# Patient Record
Sex: MALE | Race: WHITE | NOT HISPANIC OR LATINO | URBAN - METROPOLITAN AREA
[De-identification: names, ages, dates, MRNs, and addresses within clinical notes are randomized per-mention and may not be internally consistent; named-entity substitution may affect disease eponyms.]

---

## 2023-07-24 ENCOUNTER — TELEPHONE (OUTPATIENT)
Dept: PRIMARY CARE | Facility: CLINIC | Age: 83
End: 2023-07-24
Payer: MEDICARE

## 2023-07-24 DIAGNOSIS — I10 HYPERTENSION, UNSPECIFIED TYPE: ICD-10-CM

## 2023-07-24 DIAGNOSIS — I25.10 CORONARY ARTERY DISEASE, UNSPECIFIED VESSEL OR LESION TYPE, UNSPECIFIED WHETHER ANGINA PRESENT, UNSPECIFIED WHETHER NATIVE OR TRANSPLANTED HEART: ICD-10-CM

## 2023-07-24 PROBLEM — L03.90 CELLULITIS: Status: ACTIVE | Noted: 2023-07-24

## 2023-07-24 PROBLEM — M25.552 LEFT HIP PAIN: Status: ACTIVE | Noted: 2023-07-24

## 2023-07-24 PROBLEM — N48.1 BALANITIS: Status: ACTIVE | Noted: 2023-07-24

## 2023-07-24 PROBLEM — K59.00 CONSTIPATION: Status: ACTIVE | Noted: 2023-07-24

## 2023-07-24 PROBLEM — M25.472 LEFT ANKLE SWELLING: Status: ACTIVE | Noted: 2023-07-24

## 2023-07-24 PROBLEM — I34.0 MITRAL REGURGITATION: Status: ACTIVE | Noted: 2023-07-24

## 2023-07-24 PROBLEM — E78.00 HYPERCHOLESTEREMIA: Status: ACTIVE | Noted: 2023-07-24

## 2023-07-24 PROBLEM — Z86.79 H/O SICK SINUS SYNDROME: Status: ACTIVE | Noted: 2023-07-24

## 2023-07-24 PROBLEM — N40.0 BENIGN NON-NODULAR PROSTATIC HYPERPLASIA: Status: ACTIVE | Noted: 2023-07-24

## 2023-07-24 RX ORDER — ACETAMINOPHEN 500 MG
1 TABLET ORAL DAILY
COMMUNITY
Start: 2018-02-20

## 2023-07-24 RX ORDER — TELMISARTAN 20 MG/1
20 TABLET ORAL DAILY
COMMUNITY

## 2023-07-24 RX ORDER — NITROGLYCERIN 0.4 MG/1
0.4 TABLET SUBLINGUAL EVERY 5 MIN PRN
COMMUNITY
Start: 2022-04-04

## 2023-07-24 RX ORDER — POLYETHYLENE GLYCOL 3350 17 G/17G
POWDER, FOR SOLUTION ORAL
COMMUNITY
Start: 2017-07-12

## 2023-07-24 RX ORDER — CLOPIDOGREL BISULFATE 75 MG/1
75 TABLET ORAL DAILY
Qty: 90 TABLET | Refills: 0 | Status: SHIPPED | OUTPATIENT
Start: 2023-07-24 | End: 2023-10-23 | Stop reason: SDUPTHER

## 2023-07-24 RX ORDER — CLOPIDOGREL BISULFATE 75 MG/1
75 TABLET ORAL DAILY
COMMUNITY
End: 2023-07-24 | Stop reason: SDUPTHER

## 2023-07-24 RX ORDER — BUSPIRONE HYDROCHLORIDE 15 MG/1
15 TABLET ORAL EVERY 12 HOURS
COMMUNITY
Start: 2023-05-12 | End: 2024-01-26

## 2023-07-24 RX ORDER — ASCORBIC ACID 500 MG
1 TABLET ORAL DAILY
COMMUNITY
Start: 2018-02-20

## 2023-07-24 RX ORDER — AMLODIPINE BESYLATE 5 MG/1
5 TABLET ORAL DAILY
Qty: 90 TABLET | Refills: 0 | Status: SHIPPED | OUTPATIENT
Start: 2023-07-24 | End: 2023-10-23 | Stop reason: SDUPTHER

## 2023-07-24 RX ORDER — AMLODIPINE BESYLATE 5 MG/1
5 TABLET ORAL DAILY
COMMUNITY
End: 2023-07-24 | Stop reason: SDUPTHER

## 2023-07-24 NOTE — TELEPHONE ENCOUNTER
Pt needs a refill on amlodipine besylate 5 MG   X 1 tablet daily     Clopidogrel 75 MG x 1 tablet daily     [facility]  [phone]    Pt is scheduled for appt 8/1/23

## 2023-08-01 ENCOUNTER — OFFICE VISIT (OUTPATIENT)
Dept: PRIMARY CARE | Facility: CLINIC | Age: 83
End: 2023-08-01
Payer: MEDICARE

## 2023-08-01 VITALS
OXYGEN SATURATION: 98 % | HEART RATE: 65 BPM | BODY MASS INDEX: 22.96 KG/M2 | SYSTOLIC BLOOD PRESSURE: 142 MMHG | WEIGHT: 155 LBS | DIASTOLIC BLOOD PRESSURE: 82 MMHG | HEIGHT: 69 IN

## 2023-08-01 DIAGNOSIS — I34.0 MITRAL VALVE INSUFFICIENCY, UNSPECIFIED ETIOLOGY: ICD-10-CM

## 2023-08-01 DIAGNOSIS — I25.10 CORONARY ARTERY DISEASE INVOLVING NATIVE CORONARY ARTERY OF NATIVE HEART WITHOUT ANGINA PECTORIS: Primary | ICD-10-CM

## 2023-08-01 DIAGNOSIS — I10 PRIMARY HYPERTENSION: ICD-10-CM

## 2023-08-01 DIAGNOSIS — I49.5 SSS (SICK SINUS SYNDROME) (MULTI): ICD-10-CM

## 2023-08-01 DIAGNOSIS — J01.01 ACUTE RECURRENT MAXILLARY SINUSITIS: ICD-10-CM

## 2023-08-01 DIAGNOSIS — E78.00 HYPERCHOLESTEREMIA: ICD-10-CM

## 2023-08-01 DIAGNOSIS — Z86.79 H/O SICK SINUS SYNDROME: ICD-10-CM

## 2023-08-01 DIAGNOSIS — N40.0 BENIGN NON-NODULAR PROSTATIC HYPERPLASIA: ICD-10-CM

## 2023-08-01 DIAGNOSIS — R42 VERTIGO: ICD-10-CM

## 2023-08-01 DIAGNOSIS — Z12.5 SCREENING PSA (PROSTATE SPECIFIC ANTIGEN): ICD-10-CM

## 2023-08-01 PROCEDURE — 3077F SYST BP >= 140 MM HG: CPT | Performed by: FAMILY MEDICINE

## 2023-08-01 PROCEDURE — 1159F MED LIST DOCD IN RCRD: CPT | Performed by: FAMILY MEDICINE

## 2023-08-01 PROCEDURE — 1036F TOBACCO NON-USER: CPT | Performed by: FAMILY MEDICINE

## 2023-08-01 PROCEDURE — 3079F DIAST BP 80-89 MM HG: CPT | Performed by: FAMILY MEDICINE

## 2023-08-01 PROCEDURE — 99214 OFFICE O/P EST MOD 30 MIN: CPT | Performed by: FAMILY MEDICINE

## 2023-08-01 PROCEDURE — 1160F RVW MEDS BY RX/DR IN RCRD: CPT | Performed by: FAMILY MEDICINE

## 2023-08-01 RX ORDER — AMOXICILLIN 875 MG/1
875 TABLET, FILM COATED ORAL 2 TIMES DAILY
Qty: 20 TABLET | Refills: 0 | Status: SHIPPED | OUTPATIENT
Start: 2023-08-01 | End: 2023-08-11

## 2023-08-01 ASSESSMENT — ENCOUNTER SYMPTOMS
CARDIOVASCULAR NEGATIVE: 1
JOINT SWELLING: 0
NUMBNESS: 0
RESPIRATORY NEGATIVE: 1
DYSURIA: 0
GASTROINTESTINAL NEGATIVE: 1
HEADACHES: 0
DIZZINESS: 1
FREQUENCY: 0
CONSTITUTIONAL NEGATIVE: 1
BACK PAIN: 0
SINUS PRESSURE: 1

## 2023-08-01 ASSESSMENT — PATIENT HEALTH QUESTIONNAIRE - PHQ9
1. LITTLE INTEREST OR PLEASURE IN DOING THINGS: NOT AT ALL
SUM OF ALL RESPONSES TO PHQ9 QUESTIONS 1 AND 2: 0
2. FEELING DOWN, DEPRESSED OR HOPELESS: NOT AT ALL

## 2023-08-01 NOTE — PROGRESS NOTES
"Subjective   Patient ID: Yung Ng is a 83 y.o. male who presents for Medication Visit.    Patient not been seen in over a year.    Patient presents for follow-up on medications.  Has been seeing cardiology on a regular basis blood pressures been running well at home according to his daughter she presents with him today.  They have been grossly running in the 120s that is more elevated today on recheck it is better.  He has had no headache no double vision does get some trouble with dizziness.  When he turns his head to the left quickly he does get dizzy with doing so.    Of the right side he does not notice any trouble he did get some popping cracking in the ears had some nasal drainage congestion sinus pressure congestion over the last several months.  No high fever or shaking chills.  Has had no troubles with abdominal pain or discomfort.  No troubles with swelling of the legs or feet.  No fever no chills no night sweats     patient presents for refill of medications.  Patient with history of hypertension.    Ears pop.  Position.     Sinus congestion.      Review of Systems   Constitutional: Negative.    HENT:  Positive for congestion, ear discharge, hearing loss and sinus pressure.    Respiratory: Negative.     Cardiovascular: Negative.    Gastrointestinal: Negative.    Genitourinary:  Negative for dysuria, frequency and scrotal swelling.   Musculoskeletal:  Negative for back pain and joint swelling.   Neurological:  Positive for dizziness. Negative for syncope, numbness and headaches.   Psychiatric/Behavioral:  Negative for behavioral problems.        Objective   /82   Pulse 65   Ht 1.74 m (5' 8.5\")   Wt 70.3 kg (155 lb)   SpO2 98%   BMI 23.22 kg/m²   BSA Body surface area is 1.84 meters squared.      Physical Exam  Constitutional:       Appearance: Normal appearance.   HENT:      Head: Normocephalic.      Left Ear: There is impacted cerumen.      Ears:      Comments: Patient has tympanic " membrane perforation on the right side.  Left side so cerumen impaction.  Pressure in the frontal and maxillary sinuses.    No anterior or posterior cervical adenopathy.    Recheck ear post irrigation tympanic membrane revealed slight erythema.  Eyes:      Pupils: Pupils are equal, round, and reactive to light.   Cardiovascular:      Rate and Rhythm: Normal rate and regular rhythm.      Pulses: Normal pulses.      Heart sounds: Normal heart sounds.   Pulmonary:      Effort: Pulmonary effort is normal.   Abdominal:      General: Abdomen is flat.   Neurological:      General: No focal deficit present.      Mental Status: He is alert and oriented to person, place, and time.     [place]-Hallpike maneuver to the left revealed some nystagmus.  Starts about 15 seconds subsides in about 45 seconds.      No visits with results within 1 Year(s) from this visit.   Latest known visit with results is:   No results found for any previous visit.     Current Outpatient Medications on File Prior to Visit   Medication Sig Dispense Refill    amLODIPine (Norvasc) 5 mg tablet Take 1 tablet (5 mg) by mouth once daily. 90 tablet 0    cholecalciferol (Vitamin D-3) 5,000 Units tablet Take 1 tablet (5,000 Units) by mouth once daily.      clopidogrel (Plavix) 75 mg tablet Take 1 tablet (75 mg) by mouth once daily. 90 tablet 0    polyethylene glycol (Glycolax, Miralax) 17 gram/dose powder Take by mouth.      ascorbic acid (Vitamin C) 500 mg tablet Take 1 tablet (500 mg) by mouth once daily.      busPIRone (Buspar) 15 mg tablet Take 1 tablet (15 mg) by mouth every 12 hours.      nitroglycerin (Nitrostat) 0.4 mg SL tablet Place 1 tablet (0.4 mg) under the tongue every 5 minutes if needed for chest pain.      telmisartan (MIcarDIS) 20 mg tablet Take 1 tablet (20 mg) by mouth once daily.       No current facility-administered medications on file prior to visit.     No images are attached to the encounter.            Assessment/Plan   Problem List  Items Addressed This Visit       Benign non-nodular prostatic hyperplasia     Doing well checking PSA level         Coronary artery disease - Primary     Doing well continue to follow-up with cardiology         H/O sick sinus syndrome     Doing well, pacemaker in place         Hypercholesteremia     Checking lipid level to see what it shows.         Hypertension     Blood pressure not at goal.  Going to have you check blood pressure at home and then send readings into me.  If the top number remains above 135 on a regular basis please call         Mitral regurgitation    Acute recurrent maxillary sinusitis     Treating for sinusitis please take antibiotic as directed.         Relevant Medications    amoxicillin (Amoxil) 875 mg tablet    Vertigo

## 2023-08-01 NOTE — ASSESSMENT & PLAN NOTE
Blood pressure not at goal.  Going to have you check blood pressure at home and then send readings into me.  If the top number remains above 135 on a regular basis please call

## 2023-08-01 NOTE — PATIENT INSTRUCTIONS
Overall doing well.  Lab studies are going to be performed including CMP, CBC, lipids, TSH, PSA.    Ear irrigation has been performed    Going to have you see physical therapy to help with the dizziness.    Would like to know how you are doing in the next 1 week with this.    Blood pressure is elevated today.  Please keep blood pressure readings at home and call if it remains above 135 on the top number on a regular basis.  Please keep blood pressure diary and drop off the readings in 4 weeks time..    Please continue to follow-up with cardiology on a regular basis.    Important to continue to have pacemaker checked.

## 2023-08-07 ENCOUNTER — TELEPHONE (OUTPATIENT)
Dept: PRIMARY CARE | Facility: CLINIC | Age: 83
End: 2023-08-07
Payer: MEDICARE

## 2023-08-07 NOTE — TELEPHONE ENCOUNTER
Pt daughter left VM about needing permission to release lab results to us from [facility]. Can fax request to [phone].     Any questions can call daughter

## 2023-10-23 ENCOUNTER — TELEPHONE (OUTPATIENT)
Dept: PRIMARY CARE | Facility: CLINIC | Age: 83
End: 2023-10-23
Payer: MEDICARE

## 2023-10-23 DIAGNOSIS — I25.10 CORONARY ARTERY DISEASE, UNSPECIFIED VESSEL OR LESION TYPE, UNSPECIFIED WHETHER ANGINA PRESENT, UNSPECIFIED WHETHER NATIVE OR TRANSPLANTED HEART: ICD-10-CM

## 2023-10-23 DIAGNOSIS — I10 HYPERTENSION, UNSPECIFIED TYPE: ICD-10-CM

## 2023-10-23 RX ORDER — AMLODIPINE BESYLATE 5 MG/1
5 TABLET ORAL DAILY
Qty: 90 TABLET | Refills: 1 | Status: SHIPPED | OUTPATIENT
Start: 2023-10-23 | End: 2024-04-12 | Stop reason: SDUPTHER

## 2023-10-23 RX ORDER — CLOPIDOGREL BISULFATE 75 MG/1
75 TABLET ORAL DAILY
Qty: 90 TABLET | Refills: 1 | Status: SHIPPED | OUTPATIENT
Start: 2023-10-23 | End: 2024-04-12 | Stop reason: SDUPTHER

## 2023-10-23 NOTE — TELEPHONE ENCOUNTER
Pt's daughter called in pt doesn't want to come in but has symptoms of UTI and would like a antibiotic prescribed    [phone] Cuca

## 2023-10-23 NOTE — TELEPHONE ENCOUNTER
Pt needs a refill   clopidogrel (Plavix) 75 mg tablet 90 day supply     amLODIPine (Norvasc) 5 mg tablet   90 day supply    [id number]   Phone: [phone]

## 2023-10-24 ENCOUNTER — TELEMEDICINE (OUTPATIENT)
Dept: PRIMARY CARE | Facility: CLINIC | Age: 83
End: 2023-10-24
Payer: MEDICARE

## 2023-10-24 DIAGNOSIS — N30.90 CYSTITIS: Primary | ICD-10-CM

## 2023-10-24 PROBLEM — I10 HTN (HYPERTENSION): Status: ACTIVE | Noted: 2017-02-15

## 2023-10-24 PROBLEM — I49.5 SSS (SICK SINUS SYNDROME) (MULTI): Status: ACTIVE | Noted: 2017-02-14

## 2023-10-24 LAB
POC APPEARANCE, URINE: ABNORMAL
POC BILIRUBIN, URINE: NEGATIVE
POC BLOOD, URINE: ABNORMAL
POC COLOR, URINE: YELLOW
POC GLUCOSE, URINE: NEGATIVE MG/DL
POC KETONES, URINE: NEGATIVE MG/DL
POC LEUKOCYTES, URINE: ABNORMAL
POC NITRITE,URINE: NEGATIVE
POC PH, URINE: 7 PH
POC PROTEIN, URINE: NEGATIVE MG/DL
POC SPECIFIC GRAVITY, URINE: 1.02
POC UROBILINOGEN, URINE: 0.2 EU/DL

## 2023-10-24 PROCEDURE — 87086 URINE CULTURE/COLONY COUNT: CPT

## 2023-10-24 PROCEDURE — 81003 URINALYSIS AUTO W/O SCOPE: CPT | Performed by: FAMILY MEDICINE

## 2023-10-24 PROCEDURE — 99213 OFFICE O/P EST LOW 20 MIN: CPT | Performed by: FAMILY MEDICINE

## 2023-10-24 RX ORDER — ASPIRIN 81 MG/1
81 TABLET ORAL DAILY
COMMUNITY

## 2023-10-24 RX ORDER — CEPHALEXIN 500 MG/1
500 CAPSULE ORAL 2 TIMES DAILY
Qty: 14 CAPSULE | Refills: 0 | Status: SHIPPED | OUTPATIENT
Start: 2023-10-24 | End: 2023-10-31

## 2023-10-24 ASSESSMENT — ENCOUNTER SYMPTOMS
APPETITE CHANGE: 0
CARDIOVASCULAR NEGATIVE: 1
ARTHRALGIAS: 0
CHILLS: 0
ACTIVITY CHANGE: 0
FEVER: 0
RESPIRATORY NEGATIVE: 1
EYES NEGATIVE: 1
DYSURIA: 1
FREQUENCY: 1
BACK PAIN: 0
FATIGUE: 0

## 2023-10-24 NOTE — PATIENT INSTRUCTIONS
All findings were discussed with the daughter.  Urinalysis consistent with UTI.    Urine will be sent for culture and sensitivity prescription for cephalexin sent to pharmacy.    If any troubles with high fever or shaking chills or flank pain or nausea or vomiting develop they will call day or night.

## 2023-10-24 NOTE — PROGRESS NOTES
Subjective   Patient ID: Yung Ng is a 83 y.o. male who presents for UTI.    Concern about UTI.  Patient and daughter present because patient did not want to come into the office.  Had some burning some frequency of urination no high fever shaking chills no flank pain or discomfort no nausea no vomiting.    We had to convert to telehealth visit.    Difficult time I could see the patient talked with patient but we were unable to hear me well.    Did this over the telephone.        UTI   Associated symptoms include frequency. Pertinent negatives include no chills.        Review of Systems   Constitutional:  Negative for activity change, appetite change, chills, fatigue and fever.   HENT: Negative.     Eyes: Negative.    Respiratory: Negative.     Cardiovascular: Negative.    Genitourinary:  Positive for dysuria and frequency. Negative for penile discharge.   Musculoskeletal:  Negative for arthralgias and back pain.       Objective   There were no vitals taken for this visit.  BSA There is no height or weight on file to calculate BSA.      Physical Exam  Constitutional:       General: He is not in acute distress.     Appearance: He is not toxic-appearing.   Pulmonary:      Effort: Pulmonary effort is normal.   Neurological:      Mental Status: He is alert. Mental status is at baseline.   Psychiatric:         Mood and Affect: Mood normal.       No visits with results within 1 Year(s) from this visit.   Latest known visit with results is:   No results found for any previous visit.     Current Outpatient Medications on File Prior to Visit   Medication Sig Dispense Refill    amLODIPine (Norvasc) 5 mg tablet Take 1 tablet (5 mg) by mouth once daily. 90 tablet 1    ascorbic acid (Vitamin C) 500 mg tablet Take 1 tablet (500 mg) by mouth once daily.      aspirin 81 mg EC tablet Take 1 tablet (81 mg) by mouth once daily.      busPIRone (Buspar) 15 mg tablet Take 1 tablet (15 mg) by mouth every 12 hours.       cholecalciferol (Vitamin D-3) 5,000 Units tablet Take 1 tablet (5,000 Units) by mouth once daily.      clopidogrel (Plavix) 75 mg tablet Take 1 tablet (75 mg) by mouth once daily. 90 tablet 1    nitroglycerin (Nitrostat) 0.4 mg SL tablet Place 1 tablet (0.4 mg) under the tongue every 5 minutes if needed for chest pain.      polyethylene glycol (Glycolax, Miralax) 17 gram/dose powder Take by mouth.      telmisartan (MIcarDIS) 20 mg tablet Take 1 tablet (20 mg) by mouth once daily.      [DISCONTINUED] amLODIPine (Norvasc) 5 mg tablet Take 1 tablet (5 mg) by mouth once daily. 90 tablet 0    [DISCONTINUED] clopidogrel (Plavix) 75 mg tablet Take 1 tablet (75 mg) by mouth once daily. 90 tablet 0     No current facility-administered medications on file prior to visit.     No images are attached to the encounter.            Assessment/Plan   Problem List Items Addressed This Visit             ICD-10-CM    Cystitis - Primary N30.90    Relevant Medications    cephalexin (Keflex) 500 mg capsule

## 2023-10-26 LAB — BACTERIA UR CULT: ABNORMAL

## 2023-11-01 ENCOUNTER — CLINICAL SUPPORT (OUTPATIENT)
Dept: PRIMARY CARE | Facility: CLINIC | Age: 83
End: 2023-11-01
Payer: MEDICARE

## 2023-11-01 DIAGNOSIS — N30.90 CYSTITIS: ICD-10-CM

## 2023-11-01 LAB
POC APPEARANCE, URINE: CLEAR
POC BILIRUBIN, URINE: NEGATIVE
POC BLOOD, URINE: ABNORMAL
POC COLOR, URINE: ABNORMAL
POC GLUCOSE, URINE: NEGATIVE MG/DL
POC KETONES, URINE: NEGATIVE MG/DL
POC LEUKOCYTES, URINE: ABNORMAL
POC NITRITE,URINE: NEGATIVE
POC PH, URINE: 6 PH
POC PROTEIN, URINE: NEGATIVE MG/DL
POC SPECIFIC GRAVITY, URINE: 1.02
POC UROBILINOGEN, URINE: 0.2 EU/DL

## 2023-11-01 PROCEDURE — 87086 URINE CULTURE/COLONY COUNT: CPT

## 2023-11-01 PROCEDURE — 81003 URINALYSIS AUTO W/O SCOPE: CPT | Performed by: FAMILY MEDICINE

## 2023-11-03 LAB — BACTERIA UR CULT: NORMAL

## 2024-01-04 ENCOUNTER — TELEPHONE (OUTPATIENT)
Dept: PRIMARY CARE | Facility: CLINIC | Age: 84
End: 2024-01-04
Payer: MEDICARE

## 2024-01-04 NOTE — TELEPHONE ENCOUNTER
Pt's daughter Cuca Ng called because she received a letter for jury duty and is the primary caregiver for her father and wants to be excused.     [facility]  Group Number [id number]  Week of 01/29/24  Badge No. [id number]  Cuca [phone]

## 2024-01-26 DIAGNOSIS — F41.9 ANXIETY: Primary | ICD-10-CM

## 2024-01-26 RX ORDER — BUSPIRONE HYDROCHLORIDE 15 MG/1
TABLET ORAL EVERY 12 HOURS
Qty: 90 TABLET | Refills: 0 | Status: SHIPPED | OUTPATIENT
Start: 2024-01-26 | End: 2024-04-18

## 2024-02-08 ENCOUNTER — TELEPHONE (OUTPATIENT)
Dept: PRIMARY CARE | Facility: CLINIC | Age: 84
End: 2024-02-08
Payer: MEDICARE

## 2024-02-08 NOTE — TELEPHONE ENCOUNTER
Patient due for an MCRA at any point. Please schedule patient and send this encounter to the provider for lab orders.     Colton Damon CMA  2/8/2024  Practice Supervisor  [facility]

## 2024-04-12 ENCOUNTER — TELEPHONE (OUTPATIENT)
Dept: PRIMARY CARE | Facility: CLINIC | Age: 84
End: 2024-04-12
Payer: MEDICARE

## 2024-04-12 DIAGNOSIS — I25.10 CORONARY ARTERY DISEASE, UNSPECIFIED VESSEL OR LESION TYPE, UNSPECIFIED WHETHER ANGINA PRESENT, UNSPECIFIED WHETHER NATIVE OR TRANSPLANTED HEART: ICD-10-CM

## 2024-04-12 DIAGNOSIS — I10 HYPERTENSION, UNSPECIFIED TYPE: ICD-10-CM

## 2024-04-12 RX ORDER — AMLODIPINE BESYLATE 5 MG/1
5 TABLET ORAL DAILY
Qty: 90 TABLET | Refills: 1 | Status: SHIPPED | OUTPATIENT
Start: 2024-04-12

## 2024-04-12 RX ORDER — CLOPIDOGREL BISULFATE 75 MG/1
75 TABLET ORAL DAILY
Qty: 90 TABLET | Refills: 1 | Status: SHIPPED | OUTPATIENT
Start: 2024-04-12

## 2024-04-12 NOTE — TELEPHONE ENCOUNTER
Pt called in requesting a refill on:    amLODIPine (Norvasc) 5 mg tablet  Take 1 tablet (5 mg) by mouth once daily.  Quantity: 90 tablet    clopidogrel (Plavix) 75 mg tablet    Take 1 tablet (75 mg) by mouth once daily.  Quantity: 90 tablet          [id number] - [place]   Phone: [phone]

## 2024-04-18 DIAGNOSIS — F41.9 ANXIETY: ICD-10-CM

## 2024-04-18 RX ORDER — BUSPIRONE HYDROCHLORIDE 15 MG/1
TABLET ORAL EVERY 12 HOURS
Qty: 90 TABLET | Refills: 0 | Status: SHIPPED | OUTPATIENT
Start: 2024-04-18

## 2024-07-16 DIAGNOSIS — F41.9 ANXIETY: ICD-10-CM

## 2024-07-16 RX ORDER — BUSPIRONE HYDROCHLORIDE 15 MG/1
TABLET ORAL EVERY 12 HOURS
Qty: 90 TABLET | Refills: 0 | Status: SHIPPED | OUTPATIENT
Start: 2024-07-16

## 2024-10-09 ENCOUNTER — TELEPHONE (OUTPATIENT)
Dept: PRIMARY CARE | Facility: CLINIC | Age: 84
End: 2024-10-09
Payer: MEDICARE

## 2024-10-09 NOTE — TELEPHONE ENCOUNTER
Pts  daughters called requesting for refill for patient for     amLODIPine (Norvasc) 5 mg tablet   Take 1 tablet (5 mg) by mouth once daily.   Quantity: 90 tablet     clopidogrel (Plavix) 75 mg tablet   Take 1 tablet (75 mg) by mouth once daily.   Quantity: 90 tablet     [facility] [address] [place] [address] DRIVE     Phone: [phone]   Fax: [phone]       Pts  daughters phone: [phone] who requested to call her if pt needs an appointment for these meds thank you!

## 2024-10-10 DIAGNOSIS — I10 HYPERTENSION, UNSPECIFIED TYPE: ICD-10-CM

## 2024-10-10 DIAGNOSIS — I25.10 CORONARY ARTERY DISEASE, UNSPECIFIED VESSEL OR LESION TYPE, UNSPECIFIED WHETHER ANGINA PRESENT, UNSPECIFIED WHETHER NATIVE OR TRANSPLANTED HEART: ICD-10-CM

## 2024-10-10 RX ORDER — AMLODIPINE BESYLATE 5 MG/1
5 TABLET ORAL DAILY
Qty: 90 TABLET | Refills: 1 | Status: SHIPPED | OUTPATIENT
Start: 2024-10-10

## 2024-10-10 RX ORDER — CLOPIDOGREL BISULFATE 75 MG/1
75 TABLET ORAL DAILY
Qty: 90 TABLET | Refills: 1 | Status: SHIPPED | OUTPATIENT
Start: 2024-10-10

## 2024-10-15 DIAGNOSIS — I10 HYPERTENSION, UNSPECIFIED TYPE: ICD-10-CM

## 2024-10-15 DIAGNOSIS — I25.10 CORONARY ARTERY DISEASE, UNSPECIFIED VESSEL OR LESION TYPE, UNSPECIFIED WHETHER ANGINA PRESENT, UNSPECIFIED WHETHER NATIVE OR TRANSPLANTED HEART: ICD-10-CM

## 2024-10-15 RX ORDER — AMLODIPINE BESYLATE 5 MG/1
5 TABLET ORAL DAILY
Qty: 90 TABLET | Refills: 1 | Status: SHIPPED | OUTPATIENT
Start: 2024-10-15

## 2024-10-15 RX ORDER — CLOPIDOGREL BISULFATE 75 MG/1
75 TABLET ORAL DAILY
Qty: 90 TABLET | Refills: 1 | Status: SHIPPED | OUTPATIENT
Start: 2024-10-15

## 2025-04-02 DIAGNOSIS — I25.10 CORONARY ARTERY DISEASE, UNSPECIFIED VESSEL OR LESION TYPE, UNSPECIFIED WHETHER ANGINA PRESENT, UNSPECIFIED WHETHER NATIVE OR TRANSPLANTED HEART: ICD-10-CM

## 2025-04-02 DIAGNOSIS — I10 HYPERTENSION, UNSPECIFIED TYPE: ICD-10-CM

## 2025-04-03 RX ORDER — CLOPIDOGREL BISULFATE 75 MG/1
75 TABLET ORAL DAILY
Qty: 90 TABLET | Refills: 1 | Status: SHIPPED | OUTPATIENT
Start: 2025-04-03 | End: 2025-04-11 | Stop reason: SDUPTHER

## 2025-04-03 RX ORDER — AMLODIPINE BESYLATE 5 MG/1
5 TABLET ORAL DAILY
Qty: 90 TABLET | Refills: 1 | Status: SHIPPED | OUTPATIENT
Start: 2025-04-03 | End: 2025-04-11 | Stop reason: SDUPTHER

## 2025-04-11 ENCOUNTER — APPOINTMENT (OUTPATIENT)
Dept: PRIMARY CARE | Facility: CLINIC | Age: 85
End: 2025-04-11
Payer: MEDICARE

## 2025-04-11 VITALS
WEIGHT: 166 LBS | BODY MASS INDEX: 24.87 KG/M2 | TEMPERATURE: 97.2 F | DIASTOLIC BLOOD PRESSURE: 86 MMHG | SYSTOLIC BLOOD PRESSURE: 134 MMHG | OXYGEN SATURATION: 95 % | HEART RATE: 62 BPM

## 2025-04-11 DIAGNOSIS — Z00.00 ROUTINE GENERAL MEDICAL EXAMINATION AT HEALTH CARE FACILITY: Primary | ICD-10-CM

## 2025-04-11 DIAGNOSIS — Z91.81 AT MODERATE RISK FOR FALL: ICD-10-CM

## 2025-04-11 DIAGNOSIS — Z95.0 CARDIAC PACEMAKER: ICD-10-CM

## 2025-04-11 DIAGNOSIS — I49.5 SSS (SICK SINUS SYNDROME) (MULTI): ICD-10-CM

## 2025-04-11 DIAGNOSIS — I25.2 MI, OLD: ICD-10-CM

## 2025-04-11 DIAGNOSIS — N30.90 CYSTITIS: ICD-10-CM

## 2025-04-11 DIAGNOSIS — I25.10 CORONARY ARTERY DISEASE, UNSPECIFIED VESSEL OR LESION TYPE, UNSPECIFIED WHETHER ANGINA PRESENT, UNSPECIFIED WHETHER NATIVE OR TRANSPLANTED HEART: ICD-10-CM

## 2025-04-11 DIAGNOSIS — K59.00 CONSTIPATION, UNSPECIFIED CONSTIPATION TYPE: ICD-10-CM

## 2025-04-11 DIAGNOSIS — I10 HYPERTENSION, UNSPECIFIED TYPE: ICD-10-CM

## 2025-04-11 DIAGNOSIS — Z71.89 ADVANCE DIRECTIVE DISCUSSED WITH PATIENT: ICD-10-CM

## 2025-04-11 DIAGNOSIS — R42 VERTIGO: ICD-10-CM

## 2025-04-11 DIAGNOSIS — E78.00 HYPERCHOLESTEREMIA: ICD-10-CM

## 2025-04-11 PROBLEM — J01.01 ACUTE RECURRENT MAXILLARY SINUSITIS: Status: RESOLVED | Noted: 2023-08-01 | Resolved: 2025-04-11

## 2025-04-11 PROBLEM — M25.552 LEFT HIP PAIN: Status: RESOLVED | Noted: 2023-07-24 | Resolved: 2025-04-11

## 2025-04-11 PROBLEM — N48.1 BALANITIS: Status: RESOLVED | Noted: 2023-07-24 | Resolved: 2025-04-11

## 2025-04-11 PROBLEM — L03.90 CELLULITIS: Status: RESOLVED | Noted: 2023-07-24 | Resolved: 2025-04-11

## 2025-04-11 PROBLEM — Z86.79 H/O SICK SINUS SYNDROME: Status: RESOLVED | Noted: 2023-07-24 | Resolved: 2025-04-11

## 2025-04-11 PROBLEM — M25.472 LEFT ANKLE SWELLING: Status: RESOLVED | Noted: 2023-07-24 | Resolved: 2025-04-11

## 2025-04-11 PROBLEM — I34.0 MITRAL REGURGITATION: Status: RESOLVED | Noted: 2023-07-24 | Resolved: 2025-04-11

## 2025-04-11 PROCEDURE — 99497 ADVNCD CARE PLAN 30 MIN: CPT | Performed by: FAMILY MEDICINE

## 2025-04-11 PROCEDURE — 3079F DIAST BP 80-89 MM HG: CPT | Performed by: FAMILY MEDICINE

## 2025-04-11 PROCEDURE — 1036F TOBACCO NON-USER: CPT | Performed by: FAMILY MEDICINE

## 2025-04-11 PROCEDURE — 99214 OFFICE O/P EST MOD 30 MIN: CPT | Performed by: FAMILY MEDICINE

## 2025-04-11 PROCEDURE — 1123F ACP DISCUSS/DSCN MKR DOCD: CPT | Performed by: FAMILY MEDICINE

## 2025-04-11 PROCEDURE — 3075F SYST BP GE 130 - 139MM HG: CPT | Performed by: FAMILY MEDICINE

## 2025-04-11 PROCEDURE — 1159F MED LIST DOCD IN RCRD: CPT | Performed by: FAMILY MEDICINE

## 2025-04-11 PROCEDURE — 1170F FXNL STATUS ASSESSED: CPT | Performed by: FAMILY MEDICINE

## 2025-04-11 PROCEDURE — 1158F ADVNC CARE PLAN TLK DOCD: CPT | Performed by: FAMILY MEDICINE

## 2025-04-11 PROCEDURE — 99397 PER PM REEVAL EST PAT 65+ YR: CPT | Performed by: FAMILY MEDICINE

## 2025-04-11 PROCEDURE — G0439 PPPS, SUBSEQ VISIT: HCPCS | Performed by: FAMILY MEDICINE

## 2025-04-11 RX ORDER — CLOPIDOGREL BISULFATE 75 MG/1
75 TABLET ORAL DAILY
Qty: 90 TABLET | Refills: 1 | Status: SHIPPED | OUTPATIENT
Start: 2025-04-11

## 2025-04-11 RX ORDER — AMLODIPINE BESYLATE 5 MG/1
5 TABLET ORAL DAILY
Qty: 90 TABLET | Refills: 1 | Status: SHIPPED | OUTPATIENT
Start: 2025-04-11

## 2025-04-11 RX ORDER — LANOLIN ALCOHOL/MO/W.PET/CERES
1000 CREAM (GRAM) TOPICAL
COMMUNITY

## 2025-04-11 ASSESSMENT — ENCOUNTER SYMPTOMS
EYE ITCHING: 0
PHOTOPHOBIA: 0
DIAPHORESIS: 0
DEPRESSION: 1
FACIAL SWELLING: 0
SINUS PAIN: 0
SPEECH DIFFICULTY: 0
FEVER: 0
HYPERACTIVE: 0
POLYPHAGIA: 0
COLOR CHANGE: 0
RESPIRATORY NEGATIVE: 1
NUMBNESS: 0
CHOKING: 0
ACTIVITY CHANGE: 0
JOINT SWELLING: 0
PALPITATIONS: 0
CHEST TIGHTNESS: 0
LOSS OF SENSATION IN FEET: 0
ABDOMINAL DISTENTION: 0
OCCASIONAL FEELINGS OF UNSTEADINESS: 0
DYSURIA: 0
CONSTIPATION: 1
RECTAL PAIN: 0
COUGH: 0
ARTHRALGIAS: 1
FREQUENCY: 0

## 2025-04-11 ASSESSMENT — PATIENT HEALTH QUESTIONNAIRE - PHQ9
10. IF YOU CHECKED OFF ANY PROBLEMS, HOW DIFFICULT HAVE THESE PROBLEMS MADE IT FOR YOU TO DO YOUR WORK, TAKE CARE OF THINGS AT HOME, OR GET ALONG WITH OTHER PEOPLE: NOT DIFFICULT AT ALL
1. LITTLE INTEREST OR PLEASURE IN DOING THINGS: SEVERAL DAYS
SUM OF ALL RESPONSES TO PHQ9 QUESTIONS 1 AND 2: 2
2. FEELING DOWN, DEPRESSED OR HOPELESS: SEVERAL DAYS

## 2025-04-11 ASSESSMENT — ACTIVITIES OF DAILY LIVING (ADL)
DRESSING: INDEPENDENT
GROCERY_SHOPPING: INDEPENDENT
TAKING_MEDICATION: INDEPENDENT
DOING_HOUSEWORK: INDEPENDENT
BATHING: INDEPENDENT
MANAGING_FINANCES: NEEDS ASSISTANCE

## 2025-04-11 NOTE — PATIENT INSTRUCTIONS
Medications discussed with patient and with daughter    Medications reviewed.    Would like for him to take blood pressure at home.    No troubles with orthostasis in the office blood pressure stable with sitting and standing.    He do physical therapy to help with vertigo they will consider this.    Labs have been ordered including CMP, CBC, lipids, TSH.    Please call me later today with the medication that he is using for his arthritis.

## 2025-04-11 NOTE — PROGRESS NOTES
Subjective   Reason for Visit: Yung Ng is an 84 y.o. male here for a Medicare Wellness visit.          Reviewed all medications by prescribing practitioner or clinical pharmacist (such as prescriptions, OTCs, herbal therapies and supplements) and documented in the medical record.    Arthritis of hands noted.    They have been using a topical cream from one of the neighbors.  His daughter will call and let me know what it is he is otherwise has a little dizziness from time to time.  He said no troubles with chest pain or shortness of breath no abdominal pain or discomfort no rapid heart rate or or slow heart rate.  He does have pacemaker in place it has been working well it is being checked regularly.  He did have history of sick sinus syndrome.    He said no nausea no vomiting no abdominal pain or discomfort.  No troubles with numbness tingling legs or feet    Some neck.  Discomfort but doing well now.    Patient is living alone but his daughter checks in on him regularly.  Occasionally will have some vertigo.    Sometimes he notices this when he gets up.    His blood pressures have looked good at home and he is not orthostatic in the office.    He has had no troubles with abdominal pain or discomfort.  He is following up with cardiology on a regular basis.    No significant swelling of the legs or feet    Some vertigo.        Patient Care Team:  Manny Weston DO as PCP - General     Review of Systems   Constitutional:  Negative for activity change, diaphoresis and fever.   HENT:  Positive for congestion. Negative for ear discharge, facial swelling, postnasal drip, sinus pain and sneezing.    Eyes:  Negative for photophobia and itching.   Respiratory: Negative.  Negative for cough, choking and chest tightness.    Cardiovascular:  Negative for chest pain, palpitations and leg swelling.   Gastrointestinal:  Positive for constipation. Negative for abdominal distention and rectal pain.   Endocrine: Negative for  polyphagia.   Genitourinary:  Negative for dysuria, frequency, penile discharge and testicular pain.   Musculoskeletal:  Positive for arthralgias. Negative for gait problem and joint swelling.   Skin:  Negative for color change.   Neurological:  Negative for speech difficulty and numbness.   Psychiatric/Behavioral:  Negative for behavioral problems. The patient is not hyperactive.        Objective   Vitals:  /86   Pulse 62   Temp 36.2 °C (97.2 °F)   Wt 75.3 kg (166 lb)   SpO2 95%   BMI 24.87 kg/m²       Physical Exam  Constitutional:       General: He is not in acute distress.     Appearance: Normal appearance.   HENT:      Head: Normocephalic.      Comments: Has hearing aid in the right ear     Ears:      Comments: Patient has tympanic membrane perforation on the right side.  Left side so cerumen impaction.  Pressure in the frontal and maxillary sinuses.    No anterior or posterior cervical adenopathy.    Recheck ear post irrigation tympanic membrane revealed slight erythema.     Mouth/Throat:      Mouth: Mucous membranes are moist.   Eyes:      Pupils: Pupils are equal, round, and reactive to light.   Cardiovascular:      Rate and Rhythm: Normal rate and regular rhythm.      Pulses: Normal pulses.      Heart sounds: Normal heart sounds.   Pulmonary:      Effort: Pulmonary effort is normal.   Abdominal:      General: Abdomen is flat.   Musculoskeletal:         General: No deformity or signs of injury.      Cervical back: No rigidity.      Right lower leg: No edema.      Comments: Some arthralgias noted of the hands   Skin:     Findings: Bruising present.   Neurological:      General: No focal deficit present.      Mental Status: He is alert and oriented to person, place, and time.         Assessment & Plan  Coronary artery disease, unspecified vessel or lesion type, unspecified whether angina present, unspecified whether native or transplanted heart    Orders:    clopidogrel (Plavix) 75 mg tablet; Take 1  tablet (75 mg) by mouth once daily.    Routine general medical examination at health care facility    Orders:    1 Year Follow Up In Advanced Primary Care - PCP - Wellness Exam; Future    Hypertension, unspecified type    Orders:    amLODIPine (Norvasc) 5 mg tablet; Take 1 tablet (5 mg) by mouth once daily.    SSS (sick sinus syndrome) (Multi)         Cardiac pacemaker         Cystitis         Hypercholesteremia         MI, old         Vertigo         At moderate risk for fall  Recommend using a walker on a regular basis         Advance directive discussed with patient [Z71.89]         Constipation, unspecified constipation type  Discussed with patient daughter may use MiraLAX as needed for constipation                   Advance Care Planning Note     Discussion Date: 04/11/25   Discussion Participants: patient    The patient wishes to discuss Advance Care Planning today and the following is a brief summary of our discussion.     Patient has capacity to make their own medical decisions: Yes  Health Care Agent/Surrogate Decision Maker documented in chart: Yes    Documents on file and valid:  Advance Directive/Living Will: No   Health Care Power of : No  Other: discussed and code status updated  Full code  Communication of Medical Status/Prognosis:   good    Communication of Treatment Goals/Options:   good    Treatment Decisions  Goals of Care: survival is prioritized, if goals for quality or survival can reasonably be achieved   agree  Follow Up Plan  Discuss next year  Team Members  PCP  Time Statement: Total face to face time spent on advance care planning was 16 minutes with 16 minutes spent in counseling, including the explanation.    Manny Weston, DO

## 2025-04-15 ENCOUNTER — TELEPHONE (OUTPATIENT)
Dept: PRIMARY CARE | Facility: CLINIC | Age: 85
End: 2025-04-15
Payer: MEDICARE

## 2025-04-15 NOTE — TELEPHONE ENCOUNTER
Pt had cbc, cmp, lipid, tsh and t4 done at [facility] on 4/12/25 which showed normal tsh, cbc, cmp looked good and slightly elevated lipids.  Daughter notified

## 2025-05-01 DIAGNOSIS — I25.10 CORONARY ARTERY DISEASE, UNSPECIFIED VESSEL OR LESION TYPE, UNSPECIFIED WHETHER ANGINA PRESENT, UNSPECIFIED WHETHER NATIVE OR TRANSPLANTED HEART: Primary | ICD-10-CM

## 2025-05-01 DIAGNOSIS — R42 VERTIGO: ICD-10-CM

## 2025-05-01 RX ORDER — NITROGLYCERIN 0.4 MG/1
0.4 TABLET SUBLINGUAL EVERY 5 MIN PRN
Qty: 90 TABLET | Refills: 0 | Status: SHIPPED | OUTPATIENT
Start: 2025-05-01

## 2025-05-01 RX ORDER — MECLIZINE HCL 12.5 MG 12.5 MG/1
12.5 TABLET ORAL 3 TIMES DAILY PRN
Qty: 30 TABLET | Refills: 2 | Status: SHIPPED | OUTPATIENT
Start: 2025-05-01 | End: 2025-07-30